# Patient Record
Sex: FEMALE | Race: WHITE | Employment: PART TIME | URBAN - METROPOLITAN AREA
[De-identification: names, ages, dates, MRNs, and addresses within clinical notes are randomized per-mention and may not be internally consistent; named-entity substitution may affect disease eponyms.]

---

## 2021-08-22 ENCOUNTER — HOSPITAL ENCOUNTER (EMERGENCY)
Age: 31
Discharge: HOME | End: 2021-08-22

## 2021-08-22 ENCOUNTER — APPOINTMENT (OUTPATIENT)
Dept: GENERAL RADIOLOGY | Age: 31
End: 2021-08-22

## 2021-08-22 VITALS
BODY MASS INDEX: 19.26 KG/M2 | WEIGHT: 130 LBS | TEMPERATURE: 97.8 F | RESPIRATION RATE: 14 BRPM | HEIGHT: 69 IN | HEART RATE: 82 BPM | SYSTOLIC BLOOD PRESSURE: 107 MMHG | DIASTOLIC BLOOD PRESSURE: 62 MMHG | OXYGEN SATURATION: 98 %
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
## 2021-08-22 DIAGNOSIS — S61.552A DOG BITE OF LEFT WRIST, INITIAL ENCOUNTER: Primary | ICD-10-CM

## 2021-08-22 PROCEDURE — 99283 EMERGENCY DEPT VISIT LOW MDM: CPT

## 2021-08-22 PROCEDURE — 73110 X-RAY EXAM OF WRIST: CPT

## 2021-08-22 RX ORDER — METRONIDAZOLE 500 MG/1
500 TABLET ORAL 3 TIMES DAILY
Qty: 21 TABLET | Refills: 0 | Status: SHIPPED | OUTPATIENT
Start: 2021-08-22 | End: 2021-08-29

## 2021-08-22 RX ORDER — SULFAMETHOXAZOLE AND TRIMETHOPRIM 800; 160 MG/1; MG/1
1 TABLET ORAL 2 TIMES DAILY
Qty: 14 TABLET | Refills: 0 | Status: SHIPPED | OUTPATIENT
Start: 2021-08-22 | End: 2021-08-29

## 2021-08-22 ASSESSMENT — PAIN DESCRIPTION - PROGRESSION: CLINICAL_PROGRESSION: NOT CHANGED

## 2021-08-22 ASSESSMENT — PAIN DESCRIPTION - DESCRIPTORS: DESCRIPTORS: DISCOMFORT

## 2021-08-22 ASSESSMENT — PAIN DESCRIPTION - FREQUENCY: FREQUENCY: CONTINUOUS

## 2021-08-22 ASSESSMENT — PAIN DESCRIPTION - LOCATION: LOCATION: WRIST

## 2021-08-22 ASSESSMENT — PAIN SCALES - GENERAL: PAINLEVEL_OUTOF10: 5

## 2021-08-22 ASSESSMENT — PAIN DESCRIPTION - ORIENTATION: ORIENTATION: LEFT

## 2021-08-22 ASSESSMENT — PAIN DESCRIPTION - PAIN TYPE: TYPE: ACUTE PAIN

## 2021-08-22 ASSESSMENT — PAIN DESCRIPTION - ONSET: ONSET: ON-GOING

## 2021-08-22 NOTE — ED NOTES
Faxed Animal Bite form to [facility] and placed confirmation form in chart.      Fidelia Velasquez RN  08/22/21 [id number]

## 2023-01-26 NOTE — ED PROVIDER NOTES
----- Message from Pooja Madera LPN sent at 1/18/2023  2:46 PM CST -----  Patient to complete labs in 1 week (lab appointment scheduled Wednesday January 25th at 1:00 pm) -- renal panel, microalbumin urine, protein/creatinine ratio urine, OSWALDO with reflex, ANCA, C3 C4, Hepatitis panel, PLA2R, SPEP.     [facility]                                                                                                                                        Department of Emergency Medicine   ED  Provider Note  Admit Date/RoomTime: 8/22/2021 12:48 PM  ED Room: 35/        HPI:  8/22/21,   Time: 12:47 PM EDT         Epi Rodgers is a 31 y.o. female presenting to the ED for dog bite left wrist, beginning last evening. The complaint has been persistent, moderate in severity, and worsened by movement of left wrist.   Pt states she was petting her dog last evening and was bit on left wrist.   States this was her fault and she knows better than to disturb dog while they are laying down. States she did clean this off initially. Comes in with swelling and bleeding at site. Reports she did go to local urgent care and was advised to come and have X-rays. Last Td uncertain. ROS:     Constitutional: Negative for fever and chills  HENT: Negative for ear pain, sore throat and sinus pressure  Eyes: Negative for pain, discharge and redness  Respiratory:  Negative for shortness of breath, cough and wheezing  Cardiovascular: Negative for CP, edema or palpitations  Gastrointestinal: Negative for nausea, vomiting, diarrhea and abdominal distention  Genitourinary: Negative for dysuria and frequency  Musculoskeletal: See HPI  Skin: See HPI  Neurological: Negative for weakness and headaches  Hematological: Negative for adenopathy    All other systems reviewed and are negative      -------------------------------- PAST HISTORY ----------------------------------  Past Medical History:  has no past medical history on file. Past Surgical History:  has no past surgical history on file. Social History:  reports that she has been smoking cigarettes. She has never used smokeless tobacco.    Family History: family history is not on file. The patients home medications have been reviewed.     Allergies: Penicillins    --------------------------------- RESULTS ------------------------------------------  All laboratory and radiology results have been personally reviewed by myself   LABS:  No results found for this visit on 08/22/21. RADIOLOGY:  Interpreted by Radiologist.  XR WRIST LEFT (MIN 3 VIEWS)   Final Result   Soft tissue swelling in the ulnar side of the wrist and in the volar region   of the distal forearm. No air in the soft tissues. No radiopaque foreign   body. There are no intrinsic bone or joint abnormality is in the area of the left   wrist.             ----------------- NURSING NOTES AND VITALS REVIEWED ---------------   The nursing notes within the ED encounter and vital signs as below have been reviewed. /62   Pulse 82   Temp 97.8 °F (36.6 °C) (Temporal)   Resp 14   Ht 5' 9\" (1.753 m)   Wt 130 lb (59 kg)   SpO2 98%   BMI 19.20 kg/m²   Oxygen Saturation Interpretation: Normal      --------------------------------PHYSICAL EXAM------------------------------------      Constitutional/General: Alert and oriented x3, well appearing, non toxic in NAD  Head: NC/AT  Eyes: PERRL, EOMI  Mouth: Oropharynx clear, handling secretions, no trismus  Neck: Supple, full ROM, no meningeal signs  Pulmonary: Lungs clear to auscultation bilaterally, no wheezes, rales, or rhonchi. Not in respiratory distress  Cardiovascular:  Regular rate and rhythm, no murmurs, gallops, or rubs. 2+ distal pulses  Extremities: Moves all extremities x 4. Exam of left wrist remarkable for multiple puncture wounds over dorsal surface with localized swelling and point tenderness. One of the puncture wounds is oozing blood. Superficial and small. Pulses intact. ROM left wrist limited due to pain. Warm and well perfused  Skin: See above  Neurologic: GCS 15,  Intact.   No focal deficits  Psych: Normal Affect      ------------------------ ED COURSE/MEDICAL DECISION MAKING----------------------  Medications - No data to display      Medical Decision Making:    Pt is refusing Td update. X-rays show no visible FB or bony injury. She is allergic to PCN. Plan Bactrim and Flagyl for home. Pt has no insurance, trying to keep costs down as best possible. Counseling: The emergency provider has spoken with the patient and discussed todays results, in addition to providing specific details for the plan of care and counseling regarding the diagnosis and prognosis. Questions are answered at this time and they are agreeable with the plan.      ------------------------ IMPRESSION AND DISPOSITION -------------------------------    IMPRESSION  1.  Dog bite of left wrist, initial encounter        DISPOSITION  Disposition: Discharge to home  Patient condition is stable                   Laure Dupont PA-C  08/22/21 [id number]